# Patient Record
Sex: FEMALE | URBAN - METROPOLITAN AREA
[De-identification: names, ages, dates, MRNs, and addresses within clinical notes are randomized per-mention and may not be internally consistent; named-entity substitution may affect disease eponyms.]

---

## 2023-06-05 ENCOUNTER — OFFICE VISIT (OUTPATIENT)
Dept: URBAN - METROPOLITAN AREA CLINIC 71 | Facility: CLINIC | Age: 73
End: 2023-06-05
Payer: COMMERCIAL

## 2023-06-05 DIAGNOSIS — H35.371 PUCKERING OF MACULA, RIGHT EYE: ICD-10-CM

## 2023-06-05 DIAGNOSIS — H35.30 UNSPECIFIED MACULAR DEGENERATION: ICD-10-CM

## 2023-06-05 DIAGNOSIS — H25.813 COMBINED FORMS OF AGE-RELATED CATARACT, BILATERAL: ICD-10-CM

## 2023-06-05 DIAGNOSIS — H40.1134 PRIMARY OPEN-ANGLE GLAUCOMA, BILATERAL, INDETERMINATE STAGE: Primary | ICD-10-CM

## 2023-06-05 PROCEDURE — 99203 OFFICE O/P NEW LOW 30 MIN: CPT

## 2023-06-05 PROCEDURE — 92133 CPTRZD OPH DX IMG PST SGM ON: CPT

## 2023-06-05 ASSESSMENT — INTRAOCULAR PRESSURE
OD: 23
OS: 24

## 2023-06-05 NOTE — IMPRESSION/PLAN
Impression: Combined forms of age-related cataract, bilateral: H25.813. Plan: Discussed recommendation for Cataract extraction vs continuing to monitor due to glare symptoms and no significant improvement in BCA with MRx. (MRx from outside provider ~1mo ago, Dr. Marlee Wood referred patient for cat eval) Patient verbalized understanding and elects to continue monitoring at this time. Disp new MRx, directed patient to RTC in 6mo for cataract eval or RTC prn if visual symptoms worsen.

## 2023-06-05 NOTE — IMPRESSION/PLAN
Impression: Unspecified macular degeneration: H35.30. Right. Plan: Vitelliform appearance - discussed with patient.  Will monitor Q6mo with DFE/Mac OCT

## 2023-06-05 NOTE — IMPRESSION/PLAN
Impression: Primary open-angle glaucoma, bilateral, indeterminate stage: H40.1134. Plan: Discussed condition in detail with patient. Will bring patient back for HVF, will initiate latanoprost at next visit if VF correlates with RNFL for glaucoma. Baseline RNFL 6/5/23: S & I thinning OU Baseline GCA: 6/5/23:
Pachy: 
HVF: Acquire at next visit +FOHx: Father had glaucoma (-)sleep apnea

## 2023-06-05 NOTE — IMPRESSION/PLAN
Impression: Puckering of macula, right eye: H35.371. Plan: OCT shows ERM w/traction and questionable pockets of cyst. Discussed with patient and recommend consultation with Dr. Gee Negro to evaluate.  

Patient agrees, refer to Dr. Gee Negro